# Patient Record
(demographics unavailable — no encounter records)

---
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
## 2025-02-24 NOTE — IMAGING
[de-identified] : Lumbar spine: 5 out of 5 strength, 2+ reflexes, full range of motion, no tenderness to palpation, negative straight leg raise bilaterally.
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
## 2025-04-26 NOTE — ASSESSMENT
[FreeTextEntry1] : 68-year-old female for evaluation of left hip pain. Patient reports an aching nontraumatic pain that which worsens upon going from standing to standing position going up and down stairs.  Denies any trauma or falls.  He takes Advil over-the-counter without relief.  Able to bear weight and ambulate however experiences pain with doing so.  Physical examination left hip: No swelling, ecchymosis, or erythema appreciated.  Skin is intact.  Patient mildly tense palpation along the lateral hip line and the groin area.  Full range of motion upon full flexion, extension, and internal/external rotation.  No weakness is appreciated.  Calf is soft and nontender.  Negative Homans' sign.  Mildly antalgic gait.  Negative straight leg raise.  Sensorimotor intact distally.  Neuro vas intact.  X-rays left hip taken in the office today reveal osteoarthritic changes appreciated throughout.  No acute fractures, subluxations, or dislocations.   Treatment plan as discussed:  My clinical suspicion is high for flareup of arthritis of the hip given the patient's history, physical examination findings, and x-ray findings.    I recommended anti-inflammatory medication.  Meloxicam sent to patient's pharmacy to be taken as needed for pain. Benefits discussed. Confirmed no contraindication to NSAIDs.    I recommended patient rest, ice, compress, and elevate the hip regularly. Encouraged activity modification as tolerable. Encouraged gentle range of motion to avoid stiffness. No gym /sports at least until further evaluation.    Script for physical therapy provided for improved ROM and strengthening of surrounding musculature. Benefits discussed.     All questions and concerns addressed to patient's satisfaction. Patient expresses full understanding of treatment plan. Patient will follow up in 4-6 weeks with An[facility]. Will consider cortisone/gel injections in repeat evaluation if symptoms do not improve.